# Patient Record
Sex: MALE | Race: WHITE | NOT HISPANIC OR LATINO | Employment: FULL TIME | URBAN - NONMETROPOLITAN AREA
[De-identification: names, ages, dates, MRNs, and addresses within clinical notes are randomized per-mention and may not be internally consistent; named-entity substitution may affect disease eponyms.]

---

## 2022-12-14 ENCOUNTER — APPOINTMENT (EMERGENCY)
Dept: CT IMAGING | Facility: HOSPITAL | Age: 57
End: 2022-12-14

## 2022-12-14 ENCOUNTER — HOSPITAL ENCOUNTER (EMERGENCY)
Facility: HOSPITAL | Age: 57
Discharge: HOME | End: 2022-12-14
Attending: EMERGENCY MEDICINE

## 2022-12-14 VITALS
WEIGHT: 240.3 LBS | HEART RATE: 85 BPM | TEMPERATURE: 97.7 F | SYSTOLIC BLOOD PRESSURE: 154 MMHG | DIASTOLIC BLOOD PRESSURE: 88 MMHG | BODY MASS INDEX: 33.64 KG/M2 | OXYGEN SATURATION: 96 % | HEIGHT: 71 IN | RESPIRATION RATE: 17 BRPM

## 2022-12-14 DIAGNOSIS — R10.9 FLANK PAIN: Primary | ICD-10-CM

## 2022-12-14 LAB
ALBUMIN SERPL BCP-MCNC: 3.1 G/DL (ref 3.5–5)
ALP SERPL-CCNC: 158 U/L (ref 46–116)
ALT SERPL W P-5'-P-CCNC: 16 U/L (ref 12–78)
ANION GAP SERPL CALCULATED.3IONS-SCNC: 8 MMOL/L (ref 4–13)
AST SERPL W P-5'-P-CCNC: 17 U/L (ref 5–45)
BACTERIA UR QL AUTO: ABNORMAL /HPF
BASOPHILS # BLD AUTO: 0.05 THOUSANDS/ÂΜL (ref 0–0.1)
BASOPHILS NFR BLD AUTO: 1 % (ref 0–1)
BILIRUB SERPL-MCNC: 0.35 MG/DL (ref 0.2–1)
BILIRUB UR QL STRIP: NEGATIVE
BUN SERPL-MCNC: 24 MG/DL (ref 5–25)
CALCIUM ALBUM COR SERPL-MCNC: 9.6 MG/DL (ref 8.3–10.1)
CALCIUM SERPL-MCNC: 8.9 MG/DL (ref 8.3–10.1)
CHLORIDE SERPL-SCNC: 100 MMOL/L (ref 96–108)
CLARITY UR: CLEAR
CO2 SERPL-SCNC: 22 MMOL/L (ref 21–32)
COLOR UR: YELLOW
CREAT SERPL-MCNC: 2.33 MG/DL (ref 0.6–1.3)
EOSINOPHIL # BLD AUTO: 0.32 THOUSAND/ÂΜL (ref 0–0.61)
EOSINOPHIL NFR BLD AUTO: 4 % (ref 0–6)
ERYTHROCYTE [DISTWIDTH] IN BLOOD BY AUTOMATED COUNT: 13.9 % (ref 11.6–15.1)
GFR SERPL CREATININE-BSD FRML MDRD: 30 ML/MIN/1.73SQ M
GLUCOSE SERPL-MCNC: 215 MG/DL (ref 65–140)
GLUCOSE SERPL-MCNC: 297 MG/DL (ref 65–140)
GLUCOSE UR STRIP-MCNC: ABNORMAL MG/DL
HCT VFR BLD AUTO: 44.2 % (ref 36.5–49.3)
HGB BLD-MCNC: 14.5 G/DL (ref 12–17)
HGB UR QL STRIP.AUTO: ABNORMAL
IMM GRANULOCYTES # BLD AUTO: 0.06 THOUSAND/UL (ref 0–0.2)
IMM GRANULOCYTES NFR BLD AUTO: 1 % (ref 0–2)
KETONES UR STRIP-MCNC: NEGATIVE MG/DL
LACTATE SERPL-SCNC: 1.2 MMOL/L (ref 0.5–2)
LEUKOCYTE ESTERASE UR QL STRIP: ABNORMAL
LYMPHOCYTES # BLD AUTO: 1.07 THOUSANDS/ÂΜL (ref 0.6–4.47)
LYMPHOCYTES NFR BLD AUTO: 13 % (ref 14–44)
MCH RBC QN AUTO: 30.9 PG (ref 26.8–34.3)
MCHC RBC AUTO-ENTMCNC: 32.8 G/DL (ref 31.4–37.4)
MCV RBC AUTO: 94 FL (ref 82–98)
MONOCYTES # BLD AUTO: 0.76 THOUSAND/ÂΜL (ref 0.17–1.22)
MONOCYTES NFR BLD AUTO: 10 % (ref 4–12)
NEUTROPHILS # BLD AUTO: 5.76 THOUSANDS/ÂΜL (ref 1.85–7.62)
NEUTS SEG NFR BLD AUTO: 71 % (ref 43–75)
NITRITE UR QL STRIP: NEGATIVE
NON-SQ EPI CELLS URNS QL MICRO: ABNORMAL /HPF
NRBC BLD AUTO-RTO: 0 /100 WBCS
PH UR STRIP.AUTO: 5.5 [PH]
PLATELET # BLD AUTO: 150 THOUSANDS/UL (ref 149–390)
PMV BLD AUTO: 10.1 FL (ref 8.9–12.7)
POTASSIUM SERPL-SCNC: 3.9 MMOL/L (ref 3.5–5.3)
PROT SERPL-MCNC: 6.8 G/DL (ref 6.4–8.4)
PROT UR STRIP-MCNC: ABNORMAL MG/DL
RBC # BLD AUTO: 4.69 MILLION/UL (ref 3.88–5.62)
RBC #/AREA URNS AUTO: ABNORMAL /HPF
SODIUM SERPL-SCNC: 130 MMOL/L (ref 135–147)
SP GR UR STRIP.AUTO: 1.01 (ref 1–1.03)
UROBILINOGEN UR QL STRIP.AUTO: 0.2 E.U./DL
WBC # BLD AUTO: 8.02 THOUSAND/UL (ref 4.31–10.16)
WBC #/AREA URNS AUTO: ABNORMAL /HPF
WBC CLUMPS # UR AUTO: PRESENT /UL

## 2022-12-14 RX ORDER — ONDANSETRON 2 MG/ML
4 INJECTION INTRAMUSCULAR; INTRAVENOUS ONCE
Status: COMPLETED | OUTPATIENT
Start: 2022-12-14 | End: 2022-12-14

## 2022-12-14 RX ORDER — HYDROMORPHONE HCL/PF 1 MG/ML
0.5 SYRINGE (ML) INJECTION ONCE
Status: COMPLETED | OUTPATIENT
Start: 2022-12-14 | End: 2022-12-14

## 2022-12-14 RX ORDER — HYDRALAZINE HYDROCHLORIDE 25 MG/1
25 TABLET, FILM COATED ORAL EVERY 8 HOURS
COMMUNITY

## 2022-12-14 RX ORDER — AMLODIPINE BESYLATE 5 MG/1
5 TABLET ORAL DAILY
COMMUNITY

## 2022-12-14 RX ORDER — GABAPENTIN 100 MG/1
100 CAPSULE ORAL 3 TIMES DAILY
COMMUNITY

## 2022-12-14 RX ORDER — KETOROLAC TROMETHAMINE 30 MG/ML
15 INJECTION, SOLUTION INTRAMUSCULAR; INTRAVENOUS ONCE
Status: COMPLETED | OUTPATIENT
Start: 2022-12-14 | End: 2022-12-14

## 2022-12-14 RX ORDER — BENZONATATE 200 MG/1
200 CAPSULE ORAL 3 TIMES DAILY
COMMUNITY

## 2022-12-14 RX ORDER — TACROLIMUS 1 MG/1
2 CAPSULE ORAL EVERY MORNING
COMMUNITY

## 2022-12-14 RX ORDER — TACROLIMUS 1 MG/1
1 CAPSULE ORAL EVERY EVENING
COMMUNITY

## 2022-12-14 RX ORDER — BUSPIRONE HYDROCHLORIDE 5 MG/1
5 TABLET ORAL 2 TIMES DAILY
COMMUNITY

## 2022-12-14 RX ORDER — REPAGLINIDE 0.5 MG/1
0.5 TABLET ORAL
COMMUNITY

## 2022-12-14 RX ADMIN — SODIUM CHLORIDE 1000 ML: 0.9 INJECTION, SOLUTION INTRAVENOUS at 19:13

## 2022-12-14 RX ADMIN — HYDROMORPHONE HYDROCHLORIDE 0.5 MG: 1 INJECTION, SOLUTION INTRAMUSCULAR; INTRAVENOUS; SUBCUTANEOUS at 20:22

## 2022-12-14 RX ADMIN — KETOROLAC TROMETHAMINE 15 MG: 30 INJECTION, SOLUTION INTRAMUSCULAR; INTRAVENOUS at 19:16

## 2022-12-14 RX ADMIN — ONDANSETRON 4 MG: 2 INJECTION INTRAMUSCULAR; INTRAVENOUS at 19:14

## 2022-12-14 NOTE — ED PROVIDER NOTES
History  Chief Complaint   Patient presents with   • Flank Pain     Yesterday started with left lower flank and back pain, this morning pain started to radiate into left side of abdomen, states nausea and vomiting since yesterday, pt had a liver transplant 4yrs ago, hx of kidney stones     21-year-old male presents the emergency department for evaluation of left flank pain onset yesterday  Constant however waxing and waning since  States this morning pain started to radiate into the left lower abdomen  Admits to nausea and vomiting onset yesterday  Does have history of kidney stones and states this feels similar  He denies any bright red blood in urine, dysuria or urinary frequency  He denies any fevers or chills  Reports he does have history of liver transplant 4 years ago due to cancer  Patient is a  and does not live locally  History provided by:  Patient  Flank Pain  Pain location:  L flank  Pain quality: sharp    Pain radiates to:  LLQ  Pain severity:  Severe  Onset quality:  Sudden  Timing:  Constant  Progression:  Waxing and waning  Chronicity:  New  Worsened by:  Nothing  Associated symptoms: vomiting    Associated symptoms: no anorexia, no belching, no chest pain, no chills, no constipation, no cough, no diarrhea, no dysuria, no fatigue, no fever, no flatus, no hematemesis, no hematochezia, no hematuria, no melena, no nausea, no shortness of breath and no sore throat        Prior to Admission Medications   Prescriptions Last Dose Informant Patient Reported? Taking?    Iron, Ferrous Sulfate, 325 (65 Fe) MG TABS   Yes Yes   Sig: Take by mouth in the morning   amLODIPine (NORVASC) 5 mg tablet   Yes Yes   Sig: Take 5 mg by mouth daily   benzonatate (TESSALON) 200 MG capsule   Yes Yes   Sig: Take 200 mg by mouth 3 (three) times a day   busPIRone (BUSPAR) 5 mg tablet   Yes Yes   Sig: Take 5 mg by mouth 2 (two) times a day   gabapentin (NEURONTIN) 100 mg capsule   Yes Yes   Sig: Take 100 mg by mouth 3 (three) times a day   hydrALAZINE (APRESOLINE) 25 mg tablet   Yes Yes   Sig: Take 25 mg by mouth every 8 (eight) hours   repaglinide (PRANDIN) 0 5 mg tablet   Yes Yes   Sig: Take 0 5 mg by mouth 3 (three) times a day before meals   sertraline (ZOLOFT) 50 mg tablet   Yes Yes   Sig: Take 50 mg by mouth daily   tacrolimus (PROGRAF) 1 mg capsule   Yes Yes   Sig: Take 2 mg by mouth every morning   tacrolimus (PROGRAF) 1 mg capsule   Yes Yes   Sig: Take 1 mg by mouth every evening      Facility-Administered Medications: None       Past Medical History:   Diagnosis Date   • [facility] mellitus ([facility] )    • Hypertension    • Kidney stones    • Liver cancer ([facility] )        Past Surgical History:   Procedure Laterality Date   • HEPATITIS B SURFACE AB QN,LIVER TRANSPLANT (HISTORICAL)     • SKIN GRAFT         History reviewed  No pertinent family history  I have reviewed and agree with the history as documented  E-Cigarette/Vaping   • E-Cigarette Use Current Some Day User      E-Cigarette/Vaping Substances   • Nicotine Yes    • Flavoring Yes      Social History     Tobacco Use   • Smoking status: Every Day     Packs/day: 1 50     Types: Cigarettes   • Smokeless tobacco: Never   Vaping Use   • Vaping Use: Some days   • Substances: Nicotine, Flavoring   Substance Use Topics   • Alcohol use: Not Currently   • Drug use: Not Currently       Review of Systems   Constitutional: Positive for appetite change  Negative for chills, diaphoresis, fatigue and fever  HENT: Negative  Negative for sore throat  Respiratory: Negative  Negative for cough, choking, chest tightness and shortness of breath  Cardiovascular: Negative  Negative for chest pain, palpitations and leg swelling  Gastrointestinal: Positive for abdominal pain and vomiting  Negative for anorexia, constipation, diarrhea, flatus, hematemesis, hematochezia, melena and nausea  Genitourinary: Positive for flank pain  Negative for dysuria and hematuria     Skin: Negative  Neurological: Negative  All other systems reviewed and are negative  Physical Exam  Physical Exam  Vitals and nursing note reviewed  Constitutional:       Appearance: Normal appearance  He is normal weight  HENT:      Head: Normocephalic and atraumatic  Nose: Nose normal    Eyes:      Conjunctiva/sclera: Conjunctivae normal    Cardiovascular:      Rate and Rhythm: Normal rate and regular rhythm  Pulmonary:      Effort: Pulmonary effort is normal  No respiratory distress  Breath sounds: Normal breath sounds  No stridor  No wheezing, rhonchi or rales  Chest:      Chest wall: No tenderness  Abdominal:      General: Abdomen is flat  Bowel sounds are normal       Palpations: Abdomen is soft  Tenderness: There is abdominal tenderness (LLQ)  There is left CVA tenderness  There is no right CVA tenderness or guarding  Musculoskeletal:         General: Normal range of motion  Cervical back: Normal range of motion  Skin:     General: Skin is warm and dry  Capillary Refill: Capillary refill takes less than 2 seconds  Findings: No erythema or rash  Neurological:      General: No focal deficit present  Mental Status: He is alert and oriented to person, place, and time     Psychiatric:         Mood and Affect: Mood normal          Behavior: Behavior normal          Vital Signs  ED Triage Vitals [12/14/22 1830]   Temperature Pulse Respirations Blood Pressure SpO2   97 7 °F (36 5 °C) 105 20 161/98 98 %      Temp Source Heart Rate Source Patient Position - Orthostatic VS BP Location FiO2 (%)   Temporal Monitor Lying Right arm --      Pain Score       9           Vitals:    12/14/22 1830   BP: 161/98   Pulse: 105   Patient Position - Orthostatic VS: Lying         Visual Acuity      ED Medications  Medications   sodium chloride 0 9 % bolus 1,000 mL ( Intravenous Rate/Dose Change 12/14/22 1939)   ketorolac (TORADOL) injection 15 mg (15 mg Intravenous Given 12/14/22 1916)   ondansetron (ZOFRAN) injection 4 mg (4 mg Intravenous Given 12/14/22 1914)   HYDROmorphone (DILAUDID) injection 0 5 mg (0 5 mg Intravenous Given 12/14/22 2022)       Diagnostic Studies  Results Reviewed     Procedure Component Value Units Date/Time    Fingerstick Glucose (POCT) [[id number]]  (Abnormal) Collected: 12/14/22 2102    Lab Status: Final result Updated: 12/14/22 2104     POC Glucose 215 mg/dl     Urine Microscopic [[id number]]  (Abnormal) Collected: 12/14/22 2039    Lab Status: Final result Specimen: Urine, Clean Catch Updated: 12/14/22 2102     RBC, UA 1-2 /hpf      WBC, UA 30-50 /hpf      Epithelial Cells Occasional /hpf      Bacteria, UA None Seen /hpf      WBC Clumps Present    Urine culture [[id number]] Collected: 12/14/22 2039    Lab Status:  In process Specimen: Urine, Clean Catch Updated: 12/14/22 2102    UA w Reflex to Microscopic w Reflex to Culture [[id number]]  (Abnormal) Collected: 12/14/22 2039    Lab Status: Final result Specimen: Urine, Clean Catch Updated: 12/14/22 2050     Color, UA Yellow     Clarity, UA Clear     Specific Gravity, UA 1 010     pH, UA 5 5     Leukocytes, UA Moderate     Nitrite, UA Negative     Protein, UA 30 (1+) mg/dl      Glucose, UA >=1000 (1%) mg/dl      Ketones, UA Negative mg/dl      Urobilinogen, UA 0 2 E U /dl      Bilirubin, UA Negative     Occult Blood, UA Trace-Intact    Comprehensive metabolic panel [[id number]]  (Abnormal) Collected: 12/14/22 1933    Lab Status: Final result Specimen: Blood from Arm, Right Updated: 12/14/22 2007     Sodium 130 mmol/L      Potassium 3 9 mmol/L      Chloride 100 mmol/L      CO2 22 mmol/L      ANION GAP 8 mmol/L      BUN 24 mg/dL      Creatinine 2 33 mg/dL      Glucose 297 mg/dL      Calcium 8 9 mg/dL      Corrected Calcium 9 6 mg/dL      AST 17 U/L      ALT 16 U/L      Alkaline Phosphatase 158 U/L      Total Protein 6 8 g/dL      Albumin 3 1 g/dL      Total Bilirubin 0 35 mg/dL      eGFR 30 ml/min/1 73sq m     Narrative: [facility] guidelines for Chronic Kidney Disease (CKD):   •  Stage 1 with normal or high GFR (GFR > 90 mL/min/1 73 square meters)  •  Stage 2 Mild CKD (GFR = 60-89 mL/min/1 73 square meters)  •  Stage 3A Moderate CKD (GFR = 45-59 mL/min/1 73 square meters)  •  Stage 3B Moderate CKD (GFR = 30-44 mL/min/1 73 square meters)  •  Stage 4 Severe CKD (GFR = 15-29 mL/min/1 73 square meters)  •  Stage 5 End Stage CKD (GFR <15 mL/min/1 73 square meters)  Note: GFR calculation is accurate only with a steady state creatinine    Lactic acid [[id number]]  (Normal) Collected: 12/14/22 1933    Lab Status: Final result Specimen: Blood from Arm, Right Updated: 12/14/22 2005     LACTIC ACID 1 2 mmol/L     Narrative:      Result may be elevated if tourniquet was used during collection  CBC and differential [[id number]]  (Abnormal) Collected: 12/14/22 1933    Lab Status: Final result Specimen: Blood from Arm, Right Updated: 12/14/22 1939     WBC 8 02 Thousand/uL      RBC 4 69 Million/uL      Hemoglobin 14 5 g/dL      Hematocrit 44 2 %      MCV 94 fL      MCH 30 9 pg      MCHC 32 8 g/dL      RDW 13 9 %      MPV 10 1 fL      Platelets 881 Thousands/uL      nRBC 0 /100 WBCs      Neutrophils Relative 71 %      Immat GRANS % 1 %      Lymphocytes Relative 13 %      Monocytes Relative 10 %      Eosinophils Relative 4 %      Basophils Relative 1 %      Neutrophils Absolute 5 76 Thousands/µL      Immature Grans Absolute 0 06 Thousand/uL      Lymphocytes Absolute 1 07 Thousands/µL      Monocytes Absolute 0 76 Thousand/µL      Eosinophils Absolute 0 32 Thousand/µL      Basophils Absolute 0 05 Thousands/µL                  CT renal stone study abdomen pelvis wo contrast   Final Result by Veronica Chatterjee MD (12/14 1950)    IMPRESSION:        Medullary nephrocalcinosis with numerous nonobstructing renal calcifications  No obstructing stone or hydronephrosis  Bladder wall thickening  Correlate with urinalysis  Nonobstructed transverse colon partially protrudes into midline upper abdominal ventral hernia  Fatty liver  Workstation performed: [id number]                    Procedures  Procedures         ED Course  ED Course as of 12/14/22 2110   Wed Dec 14, 2022   1945 WBC: 8 02   1953 CT: Medullary nephrocalcinosis with numerous nonobstructing renal calcifications  No obstructing stone or hydronephrosis  Bladder wall thickening  Correlate with urinalysis  Nonobstructed transverse colon partially protrudes into midline upper abdominal ventral hernia  Fatty liver  2008 LACTIC ACID: 1 2   2008 Sodium(!): 130   2008 Creatinine(!): 2 33  Patient is not local  We did review previous lab work on his phone on his portal and creatinine is similar previously at 2 22 1 month ago   2017 LACTIC ACID: 1 2   2104 Finger stick 215                               SBIRT 20yo+    Flowsheet Row Most Recent Value   SBIRT (25 yo +)    In order to provide better care to our patients, we are screening all of our patients for alcohol and drug use  Would it be okay to ask you these screening questions? Yes Filed at: 12/14/2022 1947   Initial Alcohol Screen: US AUDIT-C     1  How often do you have a drink containing alcohol? 0 Filed at: 12/14/2022 1947   2  How many drinks containing alcohol do you have on a typical day you are drinking? 0 Filed at: 12/14/2022 1947   3a  Male UNDER 65: How often do you have five or more drinks on one occasion? 0 Filed at: 12/14/2022 1947   Audit-C Score 0 Filed at: 12/14/2022 1947   SHERI: How many times in the past year have you    Used an illegal drug or used a prescription medication for non-medical reasons? Never Filed at: 12/14/2022 1947                    MDM  Number of Diagnoses or Management Options  Flank pain: new and requires workup  Diagnosis management comments: 53-year-old male presented to the emergency department for evaluation of flank pain  Vitals and medical record reviewed    On exam he did have some left-sided flank pain  There was no rashes no swelling or bruising of the skin  His urine was negative for urinary tract infection  His glucose was elevated however trending down  CAT scan was negative for any ureteral calcification however noted for bilateral kidney calcifications which was discussed with the patient  Additionally fatty liver was discussed with the patient  No leukocytosis or lactic acidosis  Patient had some mild hyponatremia  We reviewed patient's previous lab work together on his phone's portal, creatinine at baseline  Again discussed all results and findings  He was provided copy of CAT scan results  Will follow-up with PCP  Strict return precautions were discussed and he verbalized understanding  He was clinically and hemodynamically stable for discharge  Amount and/or Complexity of Data Reviewed  Clinical lab tests: ordered and reviewed  Tests in the radiology section of CPT®: ordered and reviewed  Review and summarize past medical records: yes  Independent visualization of images, tracings, or specimens: yes        Disposition  Final diagnoses:   Flank pain     Time reflects when diagnosis was documented in both MDM as applicable and the Disposition within this note     Time User Action Codes Description Comment    12/14/2022  9:07 PM Armando Sorensen Add [R10 9] Flank pain       ED Disposition     ED Disposition   Discharge    Condition   Stable    Date/Time   Wed Dec 14, 2022  9:07 PM    Comment   Abbie Gunn discharge to home/self care  Follow-up Information     Follow up With Specialties Details Why [facility]   [address] [place] [address]  [phone]            Patient's Medications   Discharge Prescriptions    No medications on file       No discharge procedures on file      PDMP Review     None          ED Provider  Electronically Signed by Brando Ferrari PA-C  12/14/22 9151

## 2022-12-15 NOTE — DISCHARGE INSTRUCTIONS
These follow-up with your family doctor regarding the CAT scan findings below  please return with any new or worsening symptoms  CT renal stone study abdomen pelvis wo contrast   Final Result    IMPRESSION:        Medullary nephrocalcinosis with numerous nonobstructing renal calcifications  No obstructing stone or hydronephrosis  Bladder wall thickening  Correlate with urinalysis  Nonobstructed transverse colon partially protrudes into midline upper abdominal ventral hernia  Fatty liver        Workstation performed: [id number]

## 2022-12-15 NOTE — ED ATTENDING ATTESTATION
12/14/2022  Gracia Driscoll DO, saw and evaluated the patient  I have discussed the patient with the resident/non-physician practitioner and agree with the resident's/non-physician practitioner's findings, Plan of Care, and MDM as documented in the resident's/non-physician practitioner's note, except where noted  All available labs and Radiology studies were reviewed  I was present for key portions of any procedure(s) performed by the resident/non-physician practitioner and I was immediately available to provide assistance  At this point I agree with the current assessment done in the Emergency Department  I have conducted an independent evaluation of this patient a history and physical is as follows:    ED Course     61-year-old male with a history of liver transplant presented to the emergency room with complaint of left flank pain with radiation to his left upper quadrant and left lower quadrant  Some nausea and vomiting yesterday  Has history of kidney stones and reported that he felt that this pain was similar  Patient does not report any dysuria or frequency but does report that his urine was "discolored" yesterday  Patient was evaluated with the physician's assistant  Abdomen was found to be soft with left sided tenderness along entire left side of his abdomen  There was some mild to moderate left CVA tenderness as well  Remainder of the patient's exam was fairly benign  While the CT imaging did reveal the patient have multiple bilateral intrarenal calcifications as well as calcifications of the medulla, there was nothing obstructing to account for the patient's discomfort  Patient also has a history of diverticulosis/diverticulitis but there was no finding of an acute process    No clear etiology for patient's abdominal pain  He was otherwise stable for discharge  We with PAs note

## 2022-12-16 LAB — BACTERIA UR CULT: NORMAL
